# Patient Record
Sex: FEMALE | Race: WHITE
[De-identification: names, ages, dates, MRNs, and addresses within clinical notes are randomized per-mention and may not be internally consistent; named-entity substitution may affect disease eponyms.]

---

## 2018-07-11 ENCOUNTER — HOSPITAL ENCOUNTER (EMERGENCY)
Dept: HOSPITAL 33 - ED | Age: 38
Discharge: HOME | End: 2018-07-11
Payer: COMMERCIAL

## 2018-07-11 VITALS — HEART RATE: 70 BPM | OXYGEN SATURATION: 97 % | SYSTOLIC BLOOD PRESSURE: 94 MMHG | DIASTOLIC BLOOD PRESSURE: 60 MMHG

## 2018-07-11 DIAGNOSIS — N39.0: Primary | ICD-10-CM

## 2018-07-11 DIAGNOSIS — M25.572: ICD-10-CM

## 2018-07-11 DIAGNOSIS — M54.5: ICD-10-CM

## 2018-07-11 LAB
GLUCOSE UR-MCNC: NEGATIVE MG/DL
PROT UR STRIP-MCNC: NEGATIVE MG/DL
RBC # URNS HPF: (no result) /HPF (ref 0–2)
WBC URNS QL MICRO: (no result) /HPF (ref 0–5)

## 2018-07-11 PROCEDURE — 87086 URINE CULTURE/COLONY COUNT: CPT

## 2018-07-11 PROCEDURE — 99284 EMERGENCY DEPT VISIT MOD MDM: CPT

## 2018-07-11 PROCEDURE — 84703 CHORIONIC GONADOTROPIN ASSAY: CPT

## 2018-07-11 PROCEDURE — 81000 URINALYSIS NONAUTO W/SCOPE: CPT

## 2018-07-11 PROCEDURE — 73610 X-RAY EXAM OF ANKLE: CPT

## 2018-07-11 PROCEDURE — 87186 SC STD MICRODIL/AGAR DIL: CPT

## 2018-07-11 PROCEDURE — 87077 CULTURE AEROBIC IDENTIFY: CPT

## 2018-07-11 NOTE — ERPHSYRPT
- History of Present Illness


Time Seen by Provider: 07/11/18 17:37


Source: patient


Exam Limitations: no limitations


Patient Subjective Stated Complaint: urinary symptoms x 6 months. feeling 

sluggish today.. has been putting off getting it looked at.  states also having 

left ankle pain and swelling after rolling it 2 weeks ago


Triage Nursing Assessment: alert and not in distress.  able to walk to BR with 

no difficulty.  states feeling sluggish that has increased today.. has had a 

foul smelling urine x 6 months.  low abd pain and right flank pain.  states 

rolled her left ankle 2 weeks ago.


Physician History: 





38-year-old white female arrives with complaint of urinary symptoms dysuria 

symptoms for 6 months also a low back pain for 6 months,


Also states that she has had a left ankle pain after twisting it for 2 weeks,


Past medical history patient denies,





Past surgical history is tubal ligation,








Timing/Duration: other (URINARY SYMPTOMS FOR 6 MONTHS, LEFT ANKLE  PAIN X 2 

WEEKS)


Associated Symptoms: No nausea, No vomiting, No abdominal pain, No shortness of 

breath, No heartburn, No diaphoresis, No cough, No chills, No chest pain, No 

fever, No headaches, No loss of appetite, No malaise, No rash, No syncope, No 

seizure, No weakness





- Review of Systems


Constitutional: No Fever, No Chills


Eyes: No Symptoms


Ears, Nose, & Throat: No Symptoms


Respiratory: No Cough, No Dyspnea


Cardiac: No Chest Pain, No Edema, No Syncope


Abdominal/Gastrointestinal: No Abdominal Pain, No Nausea, No Vomiting, No 

Diarrhea


Genitourinary Symptoms: Dysuria, Frequency, Flank Pain, No Hematuria, No 

Hesitancy, No Incontinence, No Urgency, No Urinary Retention, No Menorrhagia, 

No Pregnancy, No Vaginal Bleeding, No Vaginal Discharge, No Vaginal Itching


Musculoskeletal: No Back Pain, No Neck Pain


Skin: No Rash


Neurological: No Dizziness, No Focal Weakness, No Sensory Changes


Psychological: No Symptoms


Endocrine: No Symptoms


All Other Systems: Reviewed and Negative





- Past Medical History


Pertinent Past Medical History: Yes





- Past Surgical History


Past Surgical History: No





- Social History


Smoking Status: Never smoker


Exposure to second hand smoke: No


Drug Use: none


Patient Lives Alone: No





- Female History


Hx Pregnant Now: No





- Nursing Vital Signs


Nursing Vital Signs: 


 Initial Vital Signs











Temperature  99.1 F   07/11/18 16:41


 


Pulse Rate  81   07/11/18 16:41


 


Respiratory Rate  16   07/11/18 16:41


 


Blood Pressure  105/69   07/11/18 16:41


 


O2 Sat by Pulse Oximetry  99   07/11/18 16:41








 Pain Scale











Pain Intensity                 4

















- Physical Exam


General Appearance: no apparent distress, alert


Eye Exam: PERRL/EOMI, eyes nml inspection


Ears, Nose, Throat Exam: normal ENT inspection, TMs normal, pharynx normal, 

moist mucous membranes


Neck Exam: normal inspection, non-tender, supple, full range of motion


Respiratory Exam: normal breath sounds, lungs clear, No respiratory distress


Cardiovascular Exam: regular rate/rhythm, normal heart sounds, normal 

peripheral pulses


Gastrointestinal/Abdomen Exam: soft, normal bowel sounds, No tenderness, No mass


Back Exam: normal inspection, normal range of motion, No CVA tenderness, No 

vertebral tenderness


Extremity Exam: normal inspection, normal range of motion, pelvis stable


Neurologic Exam: alert, oriented x 3, cooperative, CNs II-XII nml as tested, 

normal mood/affect, nml cerebellar function, nml station & gait, sensation nml, 

No motor deficits


**SpO2 Interpretation**: normal (99%)


SpO2: 99


Oxygen Delivery: Room Air





- Course


Nursing assessment & vital signs reviewed: Yes





- Radiology Exams


  ** Left Ankle


X-ray Interpretation: Interpreted by me, Negative, No Fracture, No Subluxation


Ordered Tests: 


 Active Orders 24 hr











 Category Date Time Status


 


 Splint STAT Care  07/11/18 18:44 Active


 


 ANKLE (3 VIEWS) Stat Exams  07/11/18 17:40 Taken


 


 CULTURE,URINE Stat Lab  07/11/18 17:47 Received


 


 HCG,QUALITATIVE URINE Stat Lab  07/11/18 17:44 Completed


 


 UA W/ MICROSCOPIC Stat Lab  07/11/18 17:47 Completed








Medication Summary














Discontinued Medications














Generic Name Dose Route Start Last Admin





  Trade Name Freq  PRN Reason Stop Dose Admin


 


Trimethoprim/Sulfamethoxazole  1 tab  07/11/18 18:44  





  Bactrim Ds Tablet***  PO  07/11/18 18:45  





  STAT ONE   





     





     





     





     











Lab/Rad Data: 


 Laboratory Results











  07/11/18 07/11/18 Range/Units





  17:47 17:44 


 


Ur Collection Type  VOID   


 


Urine Color  YELLOW   (YELLOW)  


 


Urine Appearance  HAZY   (CLEAR)  


 


Urine pH  6.0   (5-6)  


 


Ur Specific Gravity  1.025   (1.005-1.025)  


 


Urine Protein  NEGATIVE   (Negative)  


 


Urine Ketones  NEGATIVE   (NEGATIVE)  


 


Urine Blood  TRACE NON-HEM   (0-5)  Rikki/ul


 


Urine Nitrite  POSITIVE   (NEGATIVE)  


 


Urine Bilirubin  NEGATIVE   (NEGATIVE)  


 


Urine Urobilinogen  NORMAL   (0-1)  mg/dL


 


Ur Leukocyte Esterase  NEGATIVE   (NEGATIVE)  


 


Urine Microscopic RBC  5-10   (0-2)  /HPF


 


Urine Microscopic WBC  2-5   (0-5)  /HPF


 


Ur Epithelial Cells  MODERATE   (FEW)  /HPF


 


Urine Bacteria  MANY   (NEGATIVE)  /HPF


 


Urine Culture Reflexed  YES   (NO)  


 


Urine Glucose  NEGATIVE   (NEGATIVE)  mg/dL


 


Urine HCG, Qual   NEGATIVE  (Negative)  


 


Specimen Received  7/11 1815   














- Progress


Progress: improved


Progress Note: 





07/11/18 18:35


38-year-old white female low back pain urinary  dysuria and frequency


symptoms for 6 months also 2 weeks of a left ankle.





Patient's urine positive for 2-5mwbc/ positive nitrites, 


 patient's x ray left ankle negative 


Will place patient on BACTRIM





- Departure


Time of Disposition: 18:46


Departure Disposition: Home


Clinical Impression: 


UTI (urinary tract infection)


Qualifiers:


 Urinary tract infection type: site unspecified Hematuria presence: without 

hematuria Qualified Code(s): N39.0 - Urinary tract infection, site not specified





Left ankle pain


Qualifiers:


 Chronicity: acute Qualified Code(s): M25.572 - Pain in left ankle and joints 

of left foot





Condition: Fair


Critical Care Time: No


Referrals: 


DOCTOR,NO FAMILY [Primary Care Provider] - 


Additional Instructions: 


Return home.


Plenty of fluids.


Bactrim DS one orally twice a day for 10 days.


Naprosyn 500 mg orally twice a day with food as needed for pain.


Follow-up with your family doctor.


Ice and elevate your left ankle 24-48 hours.


Return for acute distress or for severe symptoms.


Prescriptions: 


Naproxen 500 mg*** [Naprosyn 500 MG***] 500 mg PO BID #20 tablet


Smz/Tmp Ds Tablet*** [Bactrim Ds Tablet***] 1 tab PO BID #20 tablet

## 2018-07-12 NOTE — XRAY
Indication: Pain.



Comparison: None



3 views of the left ankle obtained.  No bony, articular, or soft tissue

abnormalities.